# Patient Record
(demographics unavailable — no encounter records)

---

## 2024-10-08 NOTE — ASSESSMENT
[FreeTextEntry1] : 58-year-old woman undergoing chemotherapy and radiation therapy for cervical cancer.  Does have a number of risk factors for coronary artery disease however until a few months ago she was premenopausal, and the description is totally atypical and more likely related to anxiety or muscle spasm etc.  No exertional symptoms.  Normal cardiac exam.  Totally normal ECG at rest with sinus rhythm at 75 and normal tracing.  Lipids under pretty good control although ideally her LDL should be below 70.  Blood pressure seems under good control as well.  I reassured her that the likelihood of her having obstructive coronary disease is exceedingly small and that she does not need a stress test or an echo at this point.  Her dyspnea on exertion is probably related to her anemia from the chemotherapy and her weight.  She needs to have better control of the glucose.  I encouraged her about the cessation of the cigarette smoking and told her I would like to see her again in about 1 year for her next evaluation.  Obviously if symptoms change and she needs a reevaluation prior to that I would be happy to see her again

## 2024-10-08 NOTE — PHYSICAL EXAM
[Well Developed] : well developed [Well Nourished] : well nourished [No Acute Distress] : no acute distress [Normal Conjunctiva] : normal conjunctiva [Normal Venous Pressure] : normal venous pressure [No Carotid Bruit] : no carotid bruit [Normal S1, S2] : normal S1, S2 [No Murmur] : no murmur [No Rub] : no rub [No Gallop] : no gallop [Clear Lung Fields] : clear lung fields [Good Air Entry] : good air entry [No Respiratory Distress] : no respiratory distress  [Soft] : abdomen soft [Non Tender] : non-tender [No Masses/organomegaly] : no masses/organomegaly [Normal Bowel Sounds] : normal bowel sounds [Normal Gait] : normal gait [No Edema] : no edema [No Cyanosis] : no cyanosis [No Clubbing] : no clubbing [No Varicosities] : no varicosities [Normal Radial B/L] : normal radial B/L [Normal PT B/L] : normal PT B/L [Normal DP B/L] : normal DP B/L [No Rash] : no rash [No Skin Lesions] : no skin lesions [Moves all extremities] : moves all extremities [No Focal Deficits] : no focal deficits [Normal Speech] : normal speech [Alert and Oriented] : alert and oriented [Normal memory] : normal memory [de-identified] : Mild to moderately overweight

## 2024-10-08 NOTE — REVIEW OF SYSTEMS
[Dyspnea on exertion] : dyspnea during exertion [Chest Discomfort] : chest discomfort [Under Stress] : under stress [Negative] : Heme/Lymph [Lower Ext Edema] : no extremity edema [Palpitations] : no palpitations [Syncope] : no syncope [FreeTextEntry5] : See HPI [FreeTextEntry8] : Cervical cancer

## 2024-10-08 NOTE — HISTORY OF PRESENT ILLNESS
[FreeTextEntry1] : October 8, 2024.  Patient is a pleasant 58-year-old woman recently diagnosed and treated for cervical cancer.  Up until May of this year she was still having regular periods.  Since starting the treatments 2 months ago which includes chemotherapy and radiation she gets pinpoint chest pain that does not last that long, is never exertional, more likely when she is lying down in the evening.  She goes to the gym and does some cardio exercise and never has pain with that.  She has noted dyspnea on exertion going up stairs which seems to be new.  She smoked a half a pack a day until 4 months ago when she got her diagnosis.  There is a family history on her mother's side with her mother dying at 56 of the combination of kidney failure and heart disease, question myocardial infarction.  Her father and her siblings all have diabetes and some kidney issues but no coronary disease.  The patient has had diabetes for about 10 years and is on fark [employer] metformin and now insulin.  She is also on atorvastatin 40 mg.  She had labs on September 27 with a hemoglobin A1c of 10.2, cholesterol 145, triglycerides 186, HDL 33, LDL 70.  She has self has normal renal function with a BUN of 15 and a creatinine of 0.75 and a potassium of 3.8.  Normal thyroid function as well. She has never seen a cardiologist before.  Never told of a heart murmur.  Never had syncope near syncope palpitations etc.

## 2024-10-08 NOTE — PHYSICAL EXAM
[Well Developed] : well developed [Well Nourished] : well nourished [No Acute Distress] : no acute distress [Normal Conjunctiva] : normal conjunctiva [Normal Venous Pressure] : normal venous pressure [No Carotid Bruit] : no carotid bruit [Normal S1, S2] : normal S1, S2 [No Murmur] : no murmur [No Rub] : no rub [No Gallop] : no gallop [Clear Lung Fields] : clear lung fields [Good Air Entry] : good air entry [No Respiratory Distress] : no respiratory distress  [Soft] : abdomen soft [Non Tender] : non-tender [No Masses/organomegaly] : no masses/organomegaly [Normal Bowel Sounds] : normal bowel sounds [Normal Gait] : normal gait [No Edema] : no edema [No Cyanosis] : no cyanosis [No Clubbing] : no clubbing [No Varicosities] : no varicosities [Normal Radial B/L] : normal radial B/L [Normal PT B/L] : normal PT B/L [Normal DP B/L] : normal DP B/L [No Rash] : no rash [No Skin Lesions] : no skin lesions [Moves all extremities] : moves all extremities [No Focal Deficits] : no focal deficits [Normal Speech] : normal speech [Alert and Oriented] : alert and oriented [Normal memory] : normal memory [de-identified] : Mild to moderately overweight

## 2024-10-08 NOTE — HISTORY OF PRESENT ILLNESS
[FreeTextEntry1] : Ms. 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   8/20/24: 2fx. Tolerating treatment. Occasional abdominal pain which subsides without analgesia. Occasional LBP relieved with Tylenol. No urinary or bowel complaints. No vaginal bleeding or discharge. Occasional pruritis.  She had first cycle of chemotherapy July23 and second cycle starting today. (delayed for low ANC) Reports vaginal bleeding stopped after first cycle of chemotherapy.  (inguinal node biopsy was negative but including in low dose).    8/28/24: Presents for OTV, 7/28 fx. No abdominal or pelvic pain, no vaginal bleeding, no urinary complaints. Had gas and bloating yesterday, much improved today after taking Gas-X. On chemotherapy q3 weeks. Check CBC today.   9/3/24: Presents for OTV, Reports intermittent vaginal irritation and pruritus, but denies any vaginal bleed, discharge, no hematuria, clear yellow urine. CBC from 8/28 unremarkable. Her Tx was held today due to her most recent CBC from this morning showed plts drop to 38K/ul from 205 K/ul on 8/28/24. Patient was sent for a stat CBC and to be called by Dr. Acharya with results and further advise.  9/12/24: OTV 14/28 fx. CBC on 9/10 PLT 249K. Resumed chemotherapy. She is feeling well, no abdominal or pelvic pain, no vaginal bleeding, no GI/ complaints. She is going to the gym when she feels up to it.   9/19/24: OTV 16/28 fx. Miguel sleeve placed 9/17. She missed treatment yesterday due to fatigue and mild vaginal bleeding following sleeve placement. Today, she is still feeling fatigued and sore. She has mild vaginal bleeding, slowing down since yesterday. Some abdominal cramping. No diarrhea or constipation. No dysuria.  MRI scheduled for 9/22.  Reviewed upcoming HDR schedule and procedure, written pt education materials provided, and all questions answered. Scheduled to begin 9/30.  Pt on oral meds and insulin for diabetes - advised to hold Farxiga for 3 days prior to each procedure, hold metformin day of each procedure, and take half dose of insulin the night before procedure. She will bring her glucometer with her on days of treatment. Reviewed s&s of hypoglycemia.   9/24/24: PLT 28 from 9/23/24. Rpt today prior to treatment.  10/1/24: S/P HDR #1 yesterday PLT 95, will rpt today  10/8/24: S/P HDR #3 yesterday. Reports fatigue ?pain UA/CS +ecoli. On Bactrim. H/H 7.9/23.2, will be set up for PRBC

## 2024-10-08 NOTE — PHYSICAL EXAM
[Normal] : well developed, well nourished, in no acute distress [] : no respiratory distress [Abdomen Soft] : soft [Respiration, Rhythm And Depth] : normal respiratory rhythm and effort [No Focal Deficits] : no focal deficits [Oriented To Time, Place, And Person] : oriented to person, place, and time [Affect] : the affect was normal

## 2024-10-08 NOTE — REVIEW OF SYSTEMS
[Constipation: Grade 0] : Constipation: Grade 0 [Diarrhea: Grade 0] : Diarrhea: Grade 0 [Nausea: Grade 0] : Nausea: Grade 0 [Fatigue: Grade 1 - Fatigue relieved by rest] : Fatigue: Grade 1 - Fatigue relieved by rest [Hematuria: Grade 0] : Hematuria: Grade 0 [Urinary Incontinence: Grade 0] : Urinary Incontinence: Grade 0  [Urinary Retention: Grade 0] : Urinary Retention: Grade 0 [Urinary Tract Pain: Grade 0] : Urinary Tract Pain: Grade 0 [Urinary Urgency: Grade 0] : Urinary Urgency: Grade 0 [Urinary Frequency: Grade 0] : Urinary Frequency: Grade 0

## 2024-10-08 NOTE — VITALS
[Maximal Pain Intensity: 3/10] : 3/10 [Least Pain Intensity: 0/10] : 0/10 [Pain Description/Quality: ___] : Pain description/quality: [unfilled] [Pain Duration: ___] : Pain duration: [unfilled] [Pain Location: ___] : Pain Location: [unfilled] [NoTreatment Scheduled] : no treatment scheduled [80: Normal activity with effort; some signs or symptoms of disease.] : 80: Normal activity with effort; some signs or symptoms of disease.

## 2024-10-08 NOTE — DISEASE MANAGEMENT
[Clinical] : TNM Stage: c [TTNM] : x [NTNM] : x [MTNM] : x [IIB] : IIB [de-identified] : 9533 [de-identified] : 5404 cGy [de-identified] : pelvis/cervix

## 2024-10-08 NOTE — REASON FOR VISIT
[FreeTextEntry1] : 58-year-old woman with cervical carcinoma, diabetes hypertension and hyperlipidemia, with atypical chest pain

## 2024-10-08 NOTE — DISCUSSION/SUMMARY
[FreeTextEntry1] : Reassure for now.  Not recommending any further cardiac workup at this time and no change in her medication at this time.  If symptoms change or persist would be happy to reevaluate.  Otherwise would have her follow-up with me in 1 year

## 2024-10-09 NOTE — DISEASE MANAGEMENT
[TTNM] : x [NTNM] : x [MTNM] : x [de-identified] : [id number] [de-identified] : 5404 cGy [de-identified] : pelvis/cervix

## 2024-10-09 NOTE — HISTORY OF PRESENT ILLNESS
[FreeTextEntry1] : Ms. 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   8/20/24: 2fx. Tolerating treatment. Occasional abdominal pain which subsides without analgesia. Occasional LBP relieved with Tylenol. No urinary or bowel complaints. No vaginal bleeding or discharge. Occasional pruritis.  She had first cycle of chemotherapy July23 and second cycle starting today. (delayed for low ANC) Reports vaginal bleeding stopped after first cycle of chemotherapy.  (inguinal node biopsy was negative but including in low dose).    8/28/24: Presents for OTV, 7/28 fx. No abdominal or pelvic pain, no vaginal bleeding, no urinary complaints. Had gas and bloating yesterday, much improved today after taking Gas-X. On chemotherapy q3 weeks. Check CBC today.   9/3/24: Presents for OTV, Reports intermittent vaginal irritation and pruritus, but denies any vaginal bleed, discharge, no hematuria, clear yellow urine. CBC from 8/28 unremarkable. Her Tx was held today due to her most recent CBC from this morning showed plts drop to 38K/ul from 205 K/ul on 8/28/24. Patient was sent for a stat CBC and to be called by Dr. Acharya with results and further advise.  9/12/24: OTV 14/28 fx. CBC on 9/10 PLT 249K. Resumed chemotherapy. She is feeling well, no abdominal or pelvic pain, no vaginal bleeding, no GI/ complaints. She is going to the gym when she feels up to it.   9/19/24: OTV 16/28 fx. Miguel sleeve placed 9/17. She missed treatment yesterday due to fatigue and mild vaginal bleeding following sleeve placement. Today, she is still feeling fatigued and sore. She has mild vaginal bleeding, slowing down since yesterday. Some abdominal cramping. No diarrhea or constipation. No dysuria.  MRI scheduled for 9/22.  Reviewed upcoming HDR schedule and procedure, written pt education materials provided, and all questions answered. Scheduled to begin 9/30.  Pt on oral meds and insulin for diabetes - advised to hold Farxiga for 3 days prior to each procedure, hold metformin day of each procedure, and take half dose of insulin the night before procedure. She will bring her glucometer with her on days of treatment. Reviewed s&s of hypoglycemia.   9/24/24: PLT 28 from 9/23/24. Rpt today prior to treatment.  10/1/24: S/P HDR #1 yesterday. PLT 95, will rpt today.   10/9/24: OTV, fx 22/28 today.  S/P HDR #3 on Monday. Reports fatigue. No pain, no vaginal bleeding, no GI issues.  UA/CS +ecoli. On Bactrim. Dysuria resolved.  Hgb 7.8 yesterday; scheduled for PRBCs on 10/11. HDR cancelled for today. Planning for HDR #4 on 10/14.

## 2024-10-15 NOTE — HISTORY OF PRESENT ILLNESS
[FreeTextEntry1] : Ms. 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   8/20/24: 2fx. Tolerating treatment. Occasional abdominal pain which subsides without analgesia. Occasional LBP relieved with Tylenol. No urinary or bowel complaints. No vaginal bleeding or discharge. Occasional pruritis.  She had first cycle of chemotherapy July23 and second cycle starting today. (delayed for low ANC) Reports vaginal bleeding stopped after first cycle of chemotherapy.  (inguinal node biopsy was negative but including in low dose).    8/28/24: Presents for OTV, 7/28 fx. No abdominal or pelvic pain, no vaginal bleeding, no urinary complaints. Had gas and bloating yesterday, much improved today after taking Gas-X. On chemotherapy q3 weeks. Check CBC today.   9/3/24: Presents for OTV, Reports intermittent vaginal irritation and pruritus, but denies any vaginal bleed, discharge, no hematuria, clear yellow urine. CBC from 8/28 unremarkable. Her Tx was held today due to her most recent CBC from this morning showed plts drop to 38K/ul from 205 K/ul on 8/28/24. Patient was sent for a stat CBC and to be called by Dr. Acharya with results and further advise.  9/12/24: OTV 14/28 fx. CBC on 9/10 PLT 249K. Resumed chemotherapy. She is feeling well, no abdominal or pelvic pain, no vaginal bleeding, no GI/ complaints. She is going to the gym when she feels up to it.   9/19/24: OTV 16/28 fx. Miguel sleeve placed 9/17. She missed treatment yesterday due to fatigue and mild vaginal bleeding following sleeve placement. Today, she is still feeling fatigued and sore. She has mild vaginal bleeding, slowing down since yesterday. Some abdominal cramping. No diarrhea or constipation. No dysuria.  MRI scheduled for 9/22.  Reviewed upcoming HDR schedule and procedure, written pt education materials provided, and all questions answered. Scheduled to begin 9/30.  Pt on oral meds and insulin for diabetes - advised to hold Farxiga for 3 days prior to each procedure, hold metformin day of each procedure, and take half dose of insulin the night before procedure. She will bring her glucometer with her on days of treatment. Reviewed s&s of hypoglycemia.   9/24/24: PLT 28 from 9/23/24. Rpt today prior to treatment.  10/1/24: S/P HDR #1 yesterday. PLT 95, will rpt today.   10/9/24: OTV, fx 22/28 today.  S/P HDR #3 on Monday. Reports fatigue. No pain, no vaginal bleeding, no GI issues.  UA/CS +ecoli. On Bactrim. Dysuria resolved.  Hgb 7.8 yesterday; scheduled for PRBCs on 10/11. HDR cancelled for today. Planning for HDR #4 on 10/14.    10/15/24 s/p HDR #4 10/14 H/H: 10.9/32.1

## 2024-10-15 NOTE — DISEASE MANAGEMENT
[Clinical] : TNM Stage: c [TTNM] : x [NTNM] : x [MTNM] : x [IIB] : IIB [de-identified] : 7217 [de-identified] : 5404 cGy [de-identified] : pelvis/cervix

## 2024-10-15 NOTE — PHYSICAL EXAM
[Normal] : well developed, well nourished, in no acute distress [Respiration, Rhythm And Depth] : normal respiratory rhythm and effort [] : no respiratory distress [Edema] : no peripheral edema present [Abdomen Soft] : soft [No Focal Deficits] : no focal deficits [Affect] : the affect was normal [Oriented To Time, Place, And Person] : oriented to person, place, and time

## 2024-10-22 NOTE — PHYSICAL EXAM
[Normal] : well developed, well nourished, in no acute distress [] : no respiratory distress [Respiration, Rhythm And Depth] : normal respiratory rhythm and effort [Edema] : no peripheral edema present [Abdomen Soft] : soft [No Focal Deficits] : no focal deficits [Oriented To Time, Place, And Person] : oriented to person, place, and time [Affect] : the affect was normal

## 2024-10-23 NOTE — HISTORY OF PRESENT ILLNESS
[Disease: _____________________] : Disease: [unfilled] [AJCC Stage: ____] : AJCC Stage: [unfilled] [de-identified] : Hattie is here for newly diagnosed cervical cancer. She initially noticed abnormal vaginal bleeding in April and again in May and went to ED where she had US and was referred to gyn. She then went to gyn Dr Massey who was concerned for cervical cancer and referred her to gyn/onc Dr. Goldberg. 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  CT c/a/p 6/6/24: IMPRESSION: Prominent low density endometrial thickening and distended cervix containing soft tissue density and foci of air measuring approximately 3.0 cm, corresponding to the known cervical mass No lymphadenopathy.  PET scan 7/7/24: IMPRESSION: Abnormal skull-to-thigh FDG-PET/CT scan. 1. Hypermetabolism in cervical mass/lower uterine segment corresponds to known malignancy. Prominent low density endometrial thickening is not FDG-avid. Findings are similar in appearance as compared to CT dated 6/6/2024. 2. Small FDG-avid focus in vulvar region, not well delineated on CT, is indeterminate. In the absence of a corresponding abnormality on physical exam, this may represent physiologic urinary activity. Please correlate with direct visualization. 3. An FDG-avid nonenlarged distal left external iliac/obturator lymph node and few FDG-avid bilateral inguinal lymph nodes are nonspecific. Inguinal lymph nodes may be further evaluated with ultrasound and percutaneous needle biopsy, if indicated.  She has occasional pelvic pain, bleeding has decreased. She has been having fatigue, intermittent nausea and intermittent headaches. No recent change in weight.  PMHx: DM, HTN, right shoulder pain, HLD PSHx: tubal ligation, carpal tunnel surgery Family hx: none No allergies  She works as . She has 6 siblings. (4 living) She is a , had one child who passed away at age 26 in 2017 from kidney failure. She is originally from [facility].  She is a current smoker, intermittent.  [de-identified] : small cell [FreeTextEntry1] : Carboplatin and Etoposide C4- 10/12/24 [de-identified] : Hattie is here for a follow up visit. She completed four cycles of Carboplatin and Etoposide along with EBRT. She has minimal lingering side effects of chemotherapy.  PET in Dec and f/u in Dec

## 2024-10-28 NOTE — DISEASE MANAGEMENT
[Clinical] : TNM Stage: c [IIB] : IIB [TTNM] : x [NTNM] : x [MTNM] : x [de-identified] : [id number] [de-identified] : 5404 cGy [de-identified] : pelvis/cervix

## 2024-10-28 NOTE — DISEASE MANAGEMENT
[Clinical] : TNM Stage: c [IIB] : IIB [TTNM] : x [NTNM] : x [MTNM] : x [de-identified] : 6217 [de-identified] : 5404 cGy [de-identified] : pelvis/cervix

## 2024-10-28 NOTE — HISTORY OF PRESENT ILLNESS
[FreeTextEntry1] : Ms. 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   8/20/24: 2fx. Tolerating treatment. Occasional abdominal pain which subsides without analgesia. Occasional LBP relieved with Tylenol. No urinary or bowel complaints. No vaginal bleeding or discharge. Occasional pruritis.  She had first cycle of chemotherapy July23 and second cycle starting today. (delayed for low ANC) Reports vaginal bleeding stopped after first cycle of chemotherapy.  (inguinal node biopsy was negative but including in low dose).    8/28/24: Presents for OTV, 7/28 fx. No abdominal or pelvic pain, no vaginal bleeding, no urinary complaints. Had gas and bloating yesterday, much improved today after taking Gas-X. On chemotherapy q3 weeks. Check CBC today.   9/3/24: Presents for OTV, Reports intermittent vaginal irritation and pruritus, but denies any vaginal bleed, discharge, no hematuria, clear yellow urine. CBC from 8/28 unremarkable. Her Tx was held today due to her most recent CBC from this morning showed plts drop to 38K/ul from 205 K/ul on 8/28/24. Patient was sent for a stat CBC and to be called by Dr. Acharya with results and further advise.  9/12/24: OTV 14/28 fx. CBC on 9/10 PLT 249K. Resumed chemotherapy. She is feeling well, no abdominal or pelvic pain, no vaginal bleeding, no GI/ complaints. She is going to the gym when she feels up to it.   9/19/24: OTV 16/28 fx. Miguel sleeve placed 9/17. She missed treatment yesterday due to fatigue and mild vaginal bleeding following sleeve placement. Today, she is still feeling fatigued and sore. She has mild vaginal bleeding, slowing down since yesterday. Some abdominal cramping. No diarrhea or constipation. No dysuria.  MRI scheduled for 9/22.  Reviewed upcoming HDR schedule and procedure, written pt education materials provided, and all questions answered. Scheduled to begin 9/30.  Pt on oral meds and insulin for diabetes - advised to hold Farxiga for 3 days prior to each procedure, hold metformin day of each procedure, and take half dose of insulin the night before procedure. She will bring her glucometer with her on days of treatment. Reviewed s&s of hypoglycemia.   9/24/24: PLT 28 from 9/23/24. Rpt today prior to treatment.  10/1/24: S/P HDR #1 yesterday. PLT 95, will rpt today.   10/9/24: OTV, fx 22/28 today.  S/P HDR #3 on Monday. Reports fatigue. No pain, no vaginal bleeding, no GI issues.  UA/CS +ecoli. On Bactrim. Dysuria resolved.  Hgb 7.8 yesterday; scheduled for PRBCs on 10/11. HDR cancelled for today. Planning for HDR #4 on 10/14.    10/15/24 s/p HDR #4 10/14 H/H: 10.9/32.1   10/22/24: Completed planned treatment. Reports fatigue. Denies any pain. Reports nocturia. No bowel complaints. Discharge instructions discussed. Will return for PTE as scheduled. Sleeve removed today

## 2024-10-28 NOTE — DISEASE MANAGEMENT
[Clinical] : TNM Stage: c [IIB] : IIB [TTNM] : x [NTNM] : x [MTNM] : x [de-identified] : 6322 [de-identified] : 5404 cGy [de-identified] : pelvis/cervix

## 2024-11-27 NOTE — REVIEW OF SYSTEMS
[Constipation: Grade 0] : Constipation: Grade 0 [Diarrhea: Grade 0] : Diarrhea: Grade 0 [Nausea: Grade 0] : Nausea: Grade 0 [Fatigue: Grade 1 - Fatigue relieved by rest] : Fatigue: Grade 1 - Fatigue relieved by rest [Hematuria: Grade 0] : Hematuria: Grade 0 [Urinary Incontinence: Grade 0] : Urinary Incontinence: Grade 0  [Urinary Retention: Grade 0] : Urinary Retention: Grade 0 [Urinary Tract Pain: Grade 0] : Urinary Tract Pain: Grade 0 [Urinary Urgency: Grade 0] : Urinary Urgency: Grade 0 [Urinary Frequency: Grade 0] : Urinary Frequency: Grade 0 [Pruritus: Grade 0] : Pruritus: Grade 0 [Dermatitis Radiation: Grade 0] : Dermatitis Radiation: Grade 0

## 2024-12-03 NOTE — PHYSICAL EXAM
[Normal] : well developed, well nourished, in no acute distress [] : no respiratory distress [Respiration, Rhythm And Depth] : normal respiratory rhythm and effort [Abdomen Soft] : soft [Abdomen Tenderness] : non-tender [No Focal Deficits] : no focal deficits [Oriented To Time, Place, And Person] : oriented to person, place, and time [Affect] : the affect was normal [Normal External Genitalia] : normal external genitalia  [Normal Vagina] : normal vagina without lesions or masses [Normal Cervix] : normal cervix without masses [Inguinal Lymph Nodes Enlarged Bilaterally] : inguinal [de-identified] : radiation changes [de-identified] : hyperpigmentation in tx field

## 2024-12-03 NOTE — HISTORY OF PRESENT ILLNESS
[FreeTextEntry1] : Ms. 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   She completed EBRT to the pelvis/cervix to a total dose of 5404cGy in 28 fractions from 8/19/24 - 10/22/24, with concurrent Carboplatin and Etoposide. There were some delays in treatment due to cytopenias.  She also underwent HDR via tandem and ring to a total dose of 2700cGy in 4 fractions, completed 10/14/24.   11/27/24: She presents today for PTE. She is feeling well and has returned to work. Still with some fatigue. Appetite improved. Occasional lower abdominal pain, self-resolving. No urinary complaints or GI issues. No vaginal bleeding.   She saw Dr. Juarez in October and PET/CT is scheduled for 12/2.  Vaginal dilators provided and instructions reviewed.

## 2024-12-03 NOTE — PHYSICAL EXAM
[Normal] : well developed, well nourished, in no acute distress [] : no respiratory distress [Respiration, Rhythm And Depth] : normal respiratory rhythm and effort [Abdomen Soft] : soft [Abdomen Tenderness] : non-tender [No Focal Deficits] : no focal deficits [Oriented To Time, Place, And Person] : oriented to person, place, and time [Affect] : the affect was normal [Normal External Genitalia] : normal external genitalia  [Normal Vagina] : normal vagina without lesions or masses [Normal Cervix] : normal cervix without masses [Inguinal Lymph Nodes Enlarged Bilaterally] : inguinal [de-identified] : radiation changes [de-identified] : hyperpigmentation in tx field

## 2024-12-19 NOTE — HISTORY OF PRESENT ILLNESS
[Disease: _____________________] : Disease: [unfilled] [AJCC Stage: ____] : AJCC Stage: [unfilled] [de-identified] : Hattie is here for newly diagnosed cervical cancer. She initially noticed abnormal vaginal bleeding in April and again in May and went to ED where she had US and was referred to gyn. She then went to gyn Dr Massey who was concerned for cervical cancer and referred her to gyn/onc Dr. Goldberg. 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  CT c/a/p 6/6/24: IMPRESSION: Prominent low density endometrial thickening and distended cervix containing soft tissue density and foci of air measuring approximately 3.0 cm, corresponding to the known cervical mass No lymphadenopathy.  PET scan 7/7/24: IMPRESSION: Abnormal skull-to-thigh FDG-PET/CT scan. 1. Hypermetabolism in cervical mass/lower uterine segment corresponds to known malignancy. Prominent low density endometrial thickening is not FDG-avid. Findings are similar in appearance as compared to CT dated 6/6/2024. 2. Small FDG-avid focus in vulvar region, not well delineated on CT, is indeterminate. In the absence of a corresponding abnormality on physical exam, this may represent physiologic urinary activity. Please correlate with direct visualization. 3. An FDG-avid nonenlarged distal left external iliac/obturator lymph node and few FDG-avid bilateral inguinal lymph nodes are nonspecific. Inguinal lymph nodes may be further evaluated with ultrasound and percutaneous needle biopsy, if indicated.  She has occasional pelvic pain, bleeding has decreased. She has been having fatigue, intermittent nausea and intermittent headaches. No recent change in weight.  PMHx: DM, HTN, right shoulder pain, HLD PSHx: tubal ligation, carpal tunnel surgery Family hx: none No allergies  She works as . She has 6 siblings. (4 living) She is a , had one child who passed away at age 26 in 2017 from kidney failure. She is originally from [facility].  She is a current smoker, intermittent.  [de-identified] : small cell [de-identified] : She is here for a follow up visit. She quit smoking prior to treatment. Recent PET showed good response. She has some hip pain of and on.

## 2025-01-17 NOTE — HISTORY OF PRESENT ILLNESS
[Disease: _____________________] : Disease: [unfilled] [AJCC Stage: ____] : AJCC Stage: [unfilled] [de-identified] : Hattie is here for newly diagnosed cervical cancer. She initially noticed abnormal vaginal bleeding in April and again in May and went to ED where she had US and was referred to gyn. She then went to gyn Dr Massey who was concerned for cervical cancer and referred her to gyn/onc Dr. Goldberg. 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  CT c/a/p 6/6/24: IMPRESSION: Prominent low density endometrial thickening and distended cervix containing soft tissue density and foci of air measuring approximately 3.0 cm, corresponding to the known cervical mass No lymphadenopathy.  PET scan 7/7/24: IMPRESSION: Abnormal skull-to-thigh FDG-PET/CT scan. 1. Hypermetabolism in cervical mass/lower uterine segment corresponds to known malignancy. Prominent low density endometrial thickening is not FDG-avid. Findings are similar in appearance as compared to CT dated 6/6/2024. 2. Small FDG-avid focus in vulvar region, not well delineated on CT, is indeterminate. In the absence of a corresponding abnormality on physical exam, this may represent physiologic urinary activity. Please correlate with direct visualization. 3. An FDG-avid nonenlarged distal left external iliac/obturator lymph node and few FDG-avid bilateral inguinal lymph nodes are nonspecific. Inguinal lymph nodes may be further evaluated with ultrasound and percutaneous needle biopsy, if indicated.  She has occasional pelvic pain, bleeding has decreased. She has been having fatigue, intermittent nausea and intermittent headaches. No recent change in weight.  PMHx: DM, HTN, right shoulder pain, HLD PSHx: tubal ligation, carpal tunnel surgery Family hx: none No allergies  She works as . She has 6 siblings. (4 living) She is a , had one child who passed away at age 26 in 2017 from kidney failure. She is originally from [facility]. She is a current smoker, intermittent.  She completed EBRT to the pelvis/cervix to a total dose of 5404cGy in 28 fractions from 8/19/24 - 10/22/24, with concurrent Carboplatin and Etoposide. There were some delays in treatment due to cytopenias. She also underwent HDR via tandem and ring to a total dose of 2700cGy in 4 fractions, completed 10/14/24.  [de-identified] : small cell [de-identified] : Patient is here for follow up. She reports right lateral hip pain progressively worsening over a few weeks. She says pain is only when she lays down and on her side. She has no pain with walking/standing/sitting. She says pain feels very similar to when she had "tendonitis" in her right shoulder a few years ago and needed a steroid injection. She denies abdominal pain, nausea, bowel/bladder issues, bleeding. She is traveling to [facility] next month.

## 2025-01-17 NOTE — PHYSICAL EXAM
[Fully active, able to carry on all pre-disease performance without restriction] : Status 0 - Fully active, able to carry on all pre-disease performance without restriction [Normal] : affect appropriate [de-identified] : tenderness over right lateral hip, FROM intact but pain with internal rotation of hip

## 2025-01-17 NOTE — PHYSICAL EXAM
Patient read Anaergia message and is aware to schedule mammogram  Encounter closed   [Fully active, able to carry on all pre-disease performance without restriction] : Status 0 - Fully active, able to carry on all pre-disease performance without restriction [Normal] : affect appropriate [de-identified] : tenderness over right lateral hip, FROM intact but pain with internal rotation of hip

## 2025-01-17 NOTE — HISTORY OF PRESENT ILLNESS
[Disease: _____________________] : Disease: [unfilled] [AJCC Stage: ____] : AJCC Stage: [unfilled] [de-identified] : Hattie is here for newly diagnosed cervical cancer. She initially noticed abnormal vaginal bleeding in April and again in May and went to ED where she had US and was referred to gyn. She then went to gyn Dr Massey who was concerned for cervical cancer and referred her to gyn/onc Dr. Goldberg. 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  CT c/a/p 6/6/24: IMPRESSION: Prominent low density endometrial thickening and distended cervix containing soft tissue density and foci of air measuring approximately 3.0 cm, corresponding to the known cervical mass No lymphadenopathy.  PET scan 7/7/24: IMPRESSION: Abnormal skull-to-thigh FDG-PET/CT scan. 1. Hypermetabolism in cervical mass/lower uterine segment corresponds to known malignancy. Prominent low density endometrial thickening is not FDG-avid. Findings are similar in appearance as compared to CT dated 6/6/2024. 2. Small FDG-avid focus in vulvar region, not well delineated on CT, is indeterminate. In the absence of a corresponding abnormality on physical exam, this may represent physiologic urinary activity. Please correlate with direct visualization. 3. An FDG-avid nonenlarged distal left external iliac/obturator lymph node and few FDG-avid bilateral inguinal lymph nodes are nonspecific. Inguinal lymph nodes may be further evaluated with ultrasound and percutaneous needle biopsy, if indicated.  She has occasional pelvic pain, bleeding has decreased. She has been having fatigue, intermittent nausea and intermittent headaches. No recent change in weight.  PMHx: DM, HTN, right shoulder pain, HLD PSHx: tubal ligation, carpal tunnel surgery Family hx: none No allergies  She works as . She has 6 siblings. (4 living) She is a , had one child who passed away at age 26 in 2017 from kidney failure. She is originally from [facility]. She is a current smoker, intermittent.  She completed EBRT to the pelvis/cervix to a total dose of 5404cGy in 28 fractions from 8/19/24 - 10/22/24, with concurrent Carboplatin and Etoposide. There were some delays in treatment due to cytopenias. She also underwent HDR via tandem and ring to a total dose of 2700cGy in 4 fractions, completed 10/14/24.  [de-identified] : small cell [de-identified] : Patient is here for follow up. She reports right lateral hip pain progressively worsening over a few weeks. She says pain is only when she lays down and on her side. She has no pain with walking/standing/sitting. She says pain feels very similar to when she had "tendonitis" in her right shoulder a few years ago and needed a steroid injection. She denies abdominal pain, nausea, bowel/bladder issues, bleeding. She is traveling to [facility] next month.

## 2025-01-17 NOTE — REVIEW OF SYSTEMS
[Diarrhea: Grade 0] : Diarrhea: Grade 0 [Negative] : Allergic/Immunologic [FreeTextEntry9] : right lateral hip pain

## 2025-01-17 NOTE — PHYSICAL EXAM
[Fully active, able to carry on all pre-disease performance without restriction] : Status 0 - Fully active, able to carry on all pre-disease performance without restriction [Normal] : affect appropriate [de-identified] : tenderness over right lateral hip, FROM intact but pain with internal rotation of hip

## 2025-01-17 NOTE — HISTORY OF PRESENT ILLNESS
[Disease: _____________________] : Disease: [unfilled] [AJCC Stage: ____] : AJCC Stage: [unfilled] [de-identified] : Hattie is here for newly diagnosed cervical cancer. She initially noticed abnormal vaginal bleeding in April and again in May and went to ED where she had US and was referred to gyn. She then went to gyn Dr Massey who was concerned for cervical cancer and referred her to gyn/onc Dr. Goldberg. 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  CT c/a/p 6/6/24: IMPRESSION: Prominent low density endometrial thickening and distended cervix containing soft tissue density and foci of air measuring approximately 3.0 cm, corresponding to the known cervical mass No lymphadenopathy.  PET scan 7/7/24: IMPRESSION: Abnormal skull-to-thigh FDG-PET/CT scan. 1. Hypermetabolism in cervical mass/lower uterine segment corresponds to known malignancy. Prominent low density endometrial thickening is not FDG-avid. Findings are similar in appearance as compared to CT dated 6/6/2024. 2. Small FDG-avid focus in vulvar region, not well delineated on CT, is indeterminate. In the absence of a corresponding abnormality on physical exam, this may represent physiologic urinary activity. Please correlate with direct visualization. 3. An FDG-avid nonenlarged distal left external iliac/obturator lymph node and few FDG-avid bilateral inguinal lymph nodes are nonspecific. Inguinal lymph nodes may be further evaluated with ultrasound and percutaneous needle biopsy, if indicated.  She has occasional pelvic pain, bleeding has decreased. She has been having fatigue, intermittent nausea and intermittent headaches. No recent change in weight.  PMHx: DM, HTN, right shoulder pain, HLD PSHx: tubal ligation, carpal tunnel surgery Family hx: none No allergies  She works as . She has 6 siblings. (4 living) She is a , had one child who passed away at age 26 in 2017 from kidney failure. She is originally from [facility]. She is a current smoker, intermittent.  She completed EBRT to the pelvis/cervix to a total dose of 5404cGy in 28 fractions from 8/19/24 - 10/22/24, with concurrent Carboplatin and Etoposide. There were some delays in treatment due to cytopenias. She also underwent HDR via tandem and ring to a total dose of 2700cGy in 4 fractions, completed 10/14/24.  [de-identified] : small cell [de-identified] : Patient is here for follow up. She reports right lateral hip pain progressively worsening over a few weeks. She says pain is only when she lays down and on her side. She has no pain with walking/standing/sitting. She says pain feels very similar to when she had "tendonitis" in her right shoulder a few years ago and needed a steroid injection. She denies abdominal pain, nausea, bowel/bladder issues, bleeding. She is traveling to [facility] next month.

## 2025-03-25 NOTE — HISTORY OF PRESENT ILLNESS
[Disease: _____________________] : Disease: [unfilled] [AJCC Stage: ____] : AJCC Stage: [unfilled] [de-identified] : Hattie is here for newly diagnosed cervical cancer. She initially noticed abnormal vaginal bleeding in April and again in May and went to ED where she had US and was referred to gyn. She then went to gyn Dr Massey who was concerned for cervical cancer and referred her to gyn/onc Dr. Goldberg. 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  CT c/a/p 6/6/24: IMPRESSION: Prominent low density endometrial thickening and distended cervix containing soft tissue density and foci of air measuring approximately 3.0 cm, corresponding to the known cervical mass No lymphadenopathy.  PET scan 7/7/24: IMPRESSION: Abnormal skull-to-thigh FDG-PET/CT scan. 1. Hypermetabolism in cervical mass/lower uterine segment corresponds to known malignancy. Prominent low density endometrial thickening is not FDG-avid. Findings are similar in appearance as compared to CT dated 6/6/2024. 2. Small FDG-avid focus in vulvar region, not well delineated on CT, is indeterminate. In the absence of a corresponding abnormality on physical exam, this may represent physiologic urinary activity. Please correlate with direct visualization. 3. An FDG-avid nonenlarged distal left external iliac/obturator lymph node and few FDG-avid bilateral inguinal lymph nodes are nonspecific. Inguinal lymph nodes may be further evaluated with ultrasound and percutaneous needle biopsy, if indicated.  She has occasional pelvic pain, bleeding has decreased. She has been having fatigue, intermittent nausea and intermittent headaches. No recent change in weight.  PMHx: DM, HTN, right shoulder pain, HLD PSHx: tubal ligation, carpal tunnel surgery Family hx: none No allergies  She works as . She has 6 siblings. (4 living) She is a , had one child who passed away at age 26 in 2017 from kidney failure. She is originally from [facility].  She is a current smoker, intermittent.  [de-identified] : small cell [de-identified] : She went back to work two weeks ago and its going well. She had her PNA vaccine today. She is having a little difficulty with sleep. She is up to date with mammogram.

## 2025-03-27 NOTE — PHYSICAL EXAM
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

## 2025-03-27 NOTE — HISTORY OF PRESENT ILLNESS
[FreeTextEntry1] : IIB cervical cancer  Concurrent carboplatin/etoposide with pelvic EBRT (7/2024-10/22/24) HDR tandem & ring 2700 cGy (completed 10/14/24)   Last seen 9/2024  3/15/25 PET CT - no hypermetabolsim in cervix/KHANH, redemonstration of mild nonspecific diffuse vulvar-FDG activity that has mild interval decrease compared with prior, decrease in FDG activity and size of nonspecific small left external iliac/obturator lymph node

## 2025-03-27 NOTE — PHYSICAL EXAM
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

## 2025-07-17 NOTE — REVIEW OF SYSTEMS
[Constipation: Grade 0] : Constipation: Grade 0 [Diarrhea: Grade 0] : Diarrhea: Grade 0 [Nausea: Grade 0] : Nausea: Grade 0 [Fatigue: Grade 1 - Fatigue relieved by rest] : Fatigue: Grade 1 - Fatigue relieved by rest [Hematuria: Grade 0] : Hematuria: Grade 0 [Urinary Incontinence: Grade 0] : Urinary Incontinence: Grade 0  [Urinary Retention: Grade 0] : Urinary Retention: Grade 0 [Urinary Tract Pain: Grade 0] : Urinary Tract Pain: Grade 0 [Urinary Urgency: Grade 0] : Urinary Urgency: Grade 0 [Urinary Frequency: Grade 1 - Present] : Urinary Frequency: Grade 1 - Present

## 2025-07-18 NOTE — PHYSICAL EXAM
[Normal] : well developed, well nourished, in no acute distress [] : no respiratory distress [Respiration, Rhythm And Depth] : normal respiratory rhythm and effort [Abdomen Soft] : soft [Abdomen Tenderness] : non-tender [Normal External Genitalia] : normal external genitalia  [Normal Vagina] : normal vagina without lesions or masses [Normal Cervix] : normal cervix without masses [Inguinal Lymph Nodes Enlarged Bilaterally] : inguinal [No Focal Deficits] : no focal deficits [Oriented To Time, Place, And Person] : oriented to person, place, and time [Affect] : the affect was normal [de-identified] : radiation changes, tissue fibrosis on manual exam, no suspicious lesions [de-identified] : hyperpigmentation in tx field

## 2025-07-18 NOTE — PHYSICAL EXAM
[Normal] : well developed, well nourished, in no acute distress [] : no respiratory distress [Abdomen Soft] : soft [Respiration, Rhythm And Depth] : normal respiratory rhythm and effort [Abdomen Tenderness] : non-tender [Normal External Genitalia] : normal external genitalia  [Normal Vagina] : normal vagina without lesions or masses [Normal Cervix] : normal cervix without masses [Inguinal Lymph Nodes Enlarged Bilaterally] : inguinal [No Focal Deficits] : no focal deficits [Oriented To Time, Place, And Person] : oriented to person, place, and time [Affect] : the affect was normal [de-identified] : radiation changes, tissue fibrosis on manual exam, no suspicious lesions [de-identified] : hyperpigmentation in tx field

## 2025-07-18 NOTE — HISTORY OF PRESENT ILLNESS
[FreeTextEntry1] : Ms. 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   She completed EBRT to the pelvis/cervix to a total dose of 5404cGy in 28 fractions from 8/19/24 - 10/22/24, with concurrent Carboplatin and Etoposide. There were some delays in treatment due to cytopenias.  She also underwent HDR via tandem and ring to a total dose of 2700cGy in 4 fractions, completed 10/14/24.   11/27/24: She presents today for PTE. She is feeling well and has returned to work. Still with some fatigue. Appetite improved. Occasional lower abdominal pain, self-resolving. No urinary complaints or GI issues. No vaginal bleeding.   She saw Dr. Juarez in October and PET/CT is scheduled for 12/2.  Vaginal dilators provided and instructions reviewed.   3/15/25 PET/CT:  1. No hypermetabolism within the cervix/lower uterine segment. No FDG avid demonstration of recurrence or residual FDG avid neoplasm in this region. 2. Redemonstration of mild nonspecific diffuse vulvar-FDG activity that has mild interval decreased FDG activity when compared to prior PET and is not well delineated on CT. This is nonspecific and may be assessed clinically. 3. Interval decrease of FDG activity and size of nonspecific small left external iliac/obturator node, decreased size of similar mildly avid the inguinal nodes. 4. No new focal FDG activity.  7/17/25: Presents for follow-up. Continues following with Aubrey Batista and Larry. Doing well overall, although notes increased fatigue lately and experienced two days of abdominal cramping last week, now resolved. Using vaginal dilators twice weekly and sometimes has vaginal spotting after, but no other vaginal bleeding or discharge. Urinary frequency, no incontinence. No diarrhea or constipation.

## 2025-07-18 NOTE — PHYSICAL EXAM
[Normal] : well developed, well nourished, in no acute distress [] : no respiratory distress [Abdomen Soft] : soft [Respiration, Rhythm And Depth] : normal respiratory rhythm and effort [Abdomen Tenderness] : non-tender [Normal External Genitalia] : normal external genitalia  [Normal Vagina] : normal vagina without lesions or masses [Normal Cervix] : normal cervix without masses [Inguinal Lymph Nodes Enlarged Bilaterally] : inguinal [No Focal Deficits] : no focal deficits [Oriented To Time, Place, And Person] : oriented to person, place, and time [Affect] : the affect was normal [de-identified] : radiation changes, tissue fibrosis on manual exam, no suspicious lesions [de-identified] : hyperpigmentation in tx field

## 2025-07-21 NOTE — DISCUSSION/SUMMARY
[FreeTextEntry1] : Labs sent as mentioned which will include lipid profile, lipoprotein a, and proBNP.  Schedule 2D echo and stress echo.  Further management and follow-up will be dependent upon those values and results. [EKG obtained to assist in diagnosis and management of assessed problem(s)] : EKG obtained to assist in diagnosis and management of assessed problem(s)

## 2025-07-21 NOTE — ASSESSMENT
[FreeTextEntry1] : October 8, 2024.  58-year-old woman undergoing chemotherapy and radiation therapy for cervical cancer. Does have a number of risk factors for coronary artery disease however until a few months ago she was premenopausal, and the description is totally atypical and more likely related to anxiety or muscle spasm etc. No exertional symptoms. Normal cardiac exam. Totally normal ECG at rest with sinus rhythm at 75 and normal tracing. Lipids under pretty good control although ideally her LDL should be below 70. Blood pressure seems under good control as well. I reassured her that the likelihood of her having obstructive coronary disease is exceedingly small and that she does not need a stress test or an echo at this point. Her dyspnea on exertion is probably related to her anemia from the chemotherapy and her weight. She needs to have better control of the glucose. I encouraged her about the cessation of the cigarette smoking and told her I would like to see her again in about 1 year for her next evaluation. Obviously if symptoms change and she needs a reevaluation prior to that I would be happy to see her again.  Patient returns today July 21, 2025.  As mentioned above she does have a number of risk factors for coronary disease including smoking although now she has stopped for about a year and a half and diabetes.  Her LDL has not been very elevated but her HDL is really low and there is a family history of coronary artery disease.  She now comes with an abnormal coronary artery calcium score of 252 mostly in the LAD.   Her exam is unremarkable and her EKG is sinus rhythm at 78 and for the most part a normal tracing. While her symptoms are somewhat atypical, part of it could be her trouble describing them but clearly she needs a stress  imaging study such as a stress echo and a 2D echo because of her shortness of breath independent of exertional chest pain. I am sending off lab work to include a current lipid profile, a lipoprotein a level, and a proBNP along with basic labs.

## 2025-07-21 NOTE — PHYSICAL EXAM
[Well Developed] : well developed [Well Nourished] : well nourished [No Acute Distress] : no acute distress [Normal Conjunctiva] : normal conjunctiva [Normal Venous Pressure] : normal venous pressure [No Carotid Bruit] : no carotid bruit [Normal S1, S2] : normal S1, S2 [No Murmur] : no murmur [No Rub] : no rub [No Gallop] : no gallop [Clear Lung Fields] : clear lung fields [Good Air Entry] : good air entry [No Respiratory Distress] : no respiratory distress  [Soft] : abdomen soft [Non Tender] : non-tender [No Masses/organomegaly] : no masses/organomegaly [Normal Bowel Sounds] : normal bowel sounds [Normal Gait] : normal gait [No Edema] : no edema [No Cyanosis] : no cyanosis [No Clubbing] : no clubbing [No Varicosities] : no varicosities [Normal Radial B/L] : normal radial B/L [Normal PT B/L] : normal PT B/L [Normal DP B/L] : normal DP B/L [No Rash] : no rash [No Skin Lesions] : no skin lesions [Moves all extremities] : moves all extremities [No Focal Deficits] : no focal deficits [Normal Speech] : normal speech [Alert and Oriented] : alert and oriented [Normal memory] : normal memory [de-identified] : Mild to moderately overweight

## 2025-07-21 NOTE — HISTORY OF PRESENT ILLNESS
[FreeTextEntry1] : October 8, 2024.  Patient is a pleasant 58-year-old woman recently diagnosed and treated for cervical cancer.  Up until May of this year she was still having regular periods.  Since starting the treatments 2 months ago which includes chemotherapy and radiation she gets pinpoint chest pain that does not last that long, is never exertional, more likely when she is lying down in the evening.  She goes to the gym and does some cardio exercise and never has pain with that.  She has noted dyspnea on exertion going up stairs which seems to be new.  She smoked a half a pack a day until 4 months ago when she got her diagnosis.  There is a family history on her mother's side with her mother dying at 56 of the combination of kidney failure and heart disease, question myocardial infarction.  Her father and her siblings all have diabetes and some kidney issues but no coronary disease.  The patient has had diabetes for about 10 years and is on farxiga, metformin and now insulin.  She is also on atorvastatin 40 mg.  She had labs on September 27 with a hemoglobin A1c of 10.2, cholesterol 145, triglycerides 186, HDL 33, LDL 70.  She has self has normal renal function with a BUN of 15 and a creatinine of 0.75 and a potassium of 3.8.  Normal thyroid function as well. She has never seen a cardiologist before.  Never told of a heart murmur.  Never had syncope near syncope palpitations etc. July 21, 2025.  Patient returns, first visit since the above visit in October of last year.  Then I felt that the likelihood of her having significant coronary artery disease was exceedingly small, that she did not need a stress test or an echo but that she must stop smoking.  However she was sent for a coronary artery calcium score just last week and it came back at 252, left main 0, , circumflex 41, RCA 23.  She does have some shortness of breath with exertion and occasional chest tightness although it sounds more atypical but hard to be sure the way she describes it.  Her EKG remains sinus rhythm and a normal tracing.  Cervical cancer is under control and being followed closely according to the patient.  Blood pressure excellent weight stable.